# Patient Record
Sex: MALE | Race: WHITE | Employment: STUDENT | URBAN - NONMETROPOLITAN AREA
[De-identification: names, ages, dates, MRNs, and addresses within clinical notes are randomized per-mention and may not be internally consistent; named-entity substitution may affect disease eponyms.]

---

## 2022-01-16 ENCOUNTER — HOSPITAL ENCOUNTER (INPATIENT)
Age: 22
LOS: 3 days | Discharge: HOME | DRG: 854 | End: 2022-01-19
Attending: FAMILY MEDICINE | Admitting: NURSE PRACTITIONER
Payer: COMMERCIAL

## 2022-01-16 ENCOUNTER — APPOINTMENT (OUTPATIENT)
Dept: GENERAL RADIOLOGY | Age: 22
DRG: 854 | End: 2022-01-16
Payer: COMMERCIAL

## 2022-01-16 DIAGNOSIS — G89.18 PAIN FOLLOWING SURGERY OR PROCEDURE: ICD-10-CM

## 2022-01-16 DIAGNOSIS — L03.114 CELLULITIS OF FOREARM, LEFT: Primary | ICD-10-CM

## 2022-01-16 PROBLEM — L03.90 CELLULITIS: Status: ACTIVE | Noted: 2022-01-16

## 2022-01-16 LAB
ALBUMIN SERPL-MCNC: 4 GM/DL (ref 3.4–5)
ALP BLD-CCNC: 68 U/L (ref 46–116)
ALT SERPL-CCNC: 40 U/L (ref 14–63)
ANION GAP: 10 MEQ/L (ref 8–16)
AST SERPL-CCNC: 20 U/L (ref 15–37)
BASOPHILS # BLD: 0.8 % (ref 0–3)
BILIRUB SERPL-MCNC: 1.6 MG/DL (ref 0.2–1)
BUN BLDV-MCNC: 11 MG/DL (ref 7–18)
C-REACTIVE PROTEIN: 17.24 MG/DL (ref 0–1)
CHLORIDE BLD-SCNC: 100 MEQ/L (ref 98–107)
CO2: 27 MEQ/L (ref 21–32)
CREAT SERPL-MCNC: 1.3 MG/DL (ref 0.6–1.3)
EOSINOPHILS RELATIVE PERCENT: 0.9 % (ref 0–4)
GFR, ESTIMATED: 73 ML/MIN/1.73M2
GLUCOSE BLD-MCNC: 104 MG/DL (ref 74–106)
HCT VFR BLD CALC: 46.1 % (ref 42–52)
HEMOGLOBIN: 15 GM/DL (ref 14–18)
LYMPHOCYTES # BLD: 11.5 % (ref 15–47)
MCH RBC QN AUTO: 31.1 PG (ref 27–31)
MCHC RBC AUTO-ENTMCNC: 32.6 GM/DL (ref 33–37)
MCV RBC AUTO: 95.2 FL (ref 80–94)
MONOCYTES: 13.2 % (ref 0–12)
PDW BLD-RTO: 11.8 % (ref 11.5–14.5)
PLATELET # BLD: 200 THOU/MM3 (ref 130–400)
PMV BLD AUTO: 7.8 FL (ref 7.4–10.4)
POC CALCIUM: 9 MG/DL (ref 8.5–10.1)
POTASSIUM SERPL-SCNC: 3.8 MEQ/L (ref 3.5–5.1)
RBC # BLD: 4.84 MILL/MM3 (ref 4.7–6.1)
SEGS: 73.6 % (ref 43–75)
SODIUM BLD-SCNC: 137 MEQ/L (ref 136–145)
TOTAL PROTEIN: 7.9 GM/DL (ref 6.4–8.2)
WBC # BLD: 9.7 THOU/MM3 (ref 4.8–10.8)

## 2022-01-16 PROCEDURE — 85651 RBC SED RATE NONAUTOMATED: CPT

## 2022-01-16 PROCEDURE — 87040 BLOOD CULTURE FOR BACTERIA: CPT

## 2022-01-16 PROCEDURE — 86140 C-REACTIVE PROTEIN: CPT

## 2022-01-16 PROCEDURE — 6360000002 HC RX W HCPCS: Performed by: NURSE PRACTITIONER

## 2022-01-16 PROCEDURE — 73080 X-RAY EXAM OF ELBOW: CPT

## 2022-01-16 PROCEDURE — 6370000000 HC RX 637 (ALT 250 FOR IP): Performed by: NURSE PRACTITIONER

## 2022-01-16 PROCEDURE — 2580000003 HC RX 258: Performed by: NURSE PRACTITIONER

## 2022-01-16 PROCEDURE — 80053 COMPREHEN METABOLIC PANEL: CPT

## 2022-01-16 PROCEDURE — 6360000002 HC RX W HCPCS: Performed by: FAMILY MEDICINE

## 2022-01-16 PROCEDURE — 2580000003 HC RX 258: Performed by: FAMILY MEDICINE

## 2022-01-16 PROCEDURE — 85025 COMPLETE CBC W/AUTO DIFF WBC: CPT

## 2022-01-16 PROCEDURE — 99222 1ST HOSP IP/OBS MODERATE 55: CPT | Performed by: NURSE PRACTITIONER

## 2022-01-16 PROCEDURE — 6370000000 HC RX 637 (ALT 250 FOR IP): Performed by: FAMILY MEDICINE

## 2022-01-16 PROCEDURE — 36415 COLL VENOUS BLD VENIPUNCTURE: CPT

## 2022-01-16 PROCEDURE — 1200000000 HC SEMI PRIVATE

## 2022-01-16 PROCEDURE — 99282 EMERGENCY DEPT VISIT SF MDM: CPT

## 2022-01-16 RX ORDER — ONDANSETRON 4 MG/1
4 TABLET, ORALLY DISINTEGRATING ORAL EVERY 8 HOURS PRN
Status: DISCONTINUED | OUTPATIENT
Start: 2022-01-16 | End: 2022-01-19 | Stop reason: HOSPADM

## 2022-01-16 RX ORDER — MAGNESIUM SULFATE IN WATER 40 MG/ML
2000 INJECTION, SOLUTION INTRAVENOUS PRN
Status: DISCONTINUED | OUTPATIENT
Start: 2022-01-16 | End: 2022-01-19 | Stop reason: HOSPADM

## 2022-01-16 RX ORDER — ACETAMINOPHEN 325 MG/1
650 TABLET ORAL ONCE
Status: COMPLETED | OUTPATIENT
Start: 2022-01-16 | End: 2022-01-16

## 2022-01-16 RX ORDER — HYDROCODONE BITARTRATE AND ACETAMINOPHEN 5; 325 MG/1; MG/1
2 TABLET ORAL EVERY 4 HOURS PRN
Status: DISCONTINUED | OUTPATIENT
Start: 2022-01-16 | End: 2022-01-19 | Stop reason: HOSPADM

## 2022-01-16 RX ORDER — HYDROCODONE BITARTRATE AND ACETAMINOPHEN 5; 325 MG/1; MG/1
1 TABLET ORAL EVERY 4 HOURS PRN
Status: DISCONTINUED | OUTPATIENT
Start: 2022-01-16 | End: 2022-01-19 | Stop reason: HOSPADM

## 2022-01-16 RX ORDER — SODIUM CHLORIDE 0.9 % (FLUSH) 0.9 %
5-40 SYRINGE (ML) INJECTION PRN
Status: DISCONTINUED | OUTPATIENT
Start: 2022-01-16 | End: 2022-01-19 | Stop reason: HOSPADM

## 2022-01-16 RX ORDER — POTASSIUM CHLORIDE 20 MEQ/1
40 TABLET, EXTENDED RELEASE ORAL PRN
Status: DISCONTINUED | OUTPATIENT
Start: 2022-01-16 | End: 2022-01-19 | Stop reason: HOSPADM

## 2022-01-16 RX ORDER — SODIUM CHLORIDE 0.9 % (FLUSH) 0.9 %
5-40 SYRINGE (ML) INJECTION EVERY 12 HOURS SCHEDULED
Status: DISCONTINUED | OUTPATIENT
Start: 2022-01-16 | End: 2022-01-19 | Stop reason: HOSPADM

## 2022-01-16 RX ORDER — POTASSIUM CHLORIDE 7.45 MG/ML
10 INJECTION INTRAVENOUS PRN
Status: DISCONTINUED | OUTPATIENT
Start: 2022-01-16 | End: 2022-01-19 | Stop reason: HOSPADM

## 2022-01-16 RX ORDER — CEFAZOLIN SODIUM 1 G/50ML
1000 INJECTION, SOLUTION INTRAVENOUS EVERY 8 HOURS
Status: DISCONTINUED | OUTPATIENT
Start: 2022-01-16 | End: 2022-01-19 | Stop reason: HOSPADM

## 2022-01-16 RX ORDER — SODIUM CHLORIDE 9 MG/ML
25 INJECTION, SOLUTION INTRAVENOUS PRN
Status: DISCONTINUED | OUTPATIENT
Start: 2022-01-16 | End: 2022-01-19 | Stop reason: HOSPADM

## 2022-01-16 RX ORDER — ONDANSETRON 2 MG/ML
4 INJECTION INTRAMUSCULAR; INTRAVENOUS EVERY 6 HOURS PRN
Status: DISCONTINUED | OUTPATIENT
Start: 2022-01-16 | End: 2022-01-19 | Stop reason: HOSPADM

## 2022-01-16 RX ORDER — ACETAMINOPHEN 650 MG/1
650 SUPPOSITORY RECTAL EVERY 6 HOURS PRN
Status: DISCONTINUED | OUTPATIENT
Start: 2022-01-16 | End: 2022-01-19 | Stop reason: HOSPADM

## 2022-01-16 RX ORDER — IBUPROFEN 800 MG/1
800 TABLET ORAL EVERY 6 HOURS PRN
COMMUNITY

## 2022-01-16 RX ORDER — POLYETHYLENE GLYCOL 3350 17 G/17G
17 POWDER, FOR SOLUTION ORAL DAILY PRN
Status: DISCONTINUED | OUTPATIENT
Start: 2022-01-16 | End: 2022-01-19 | Stop reason: HOSPADM

## 2022-01-16 RX ORDER — ACETAMINOPHEN 325 MG/1
650 TABLET ORAL EVERY 6 HOURS PRN
Status: DISCONTINUED | OUTPATIENT
Start: 2022-01-16 | End: 2022-01-19 | Stop reason: HOSPADM

## 2022-01-16 RX ORDER — SULFAMETHOXAZOLE AND TRIMETHOPRIM 800; 160 MG/1; MG/1
1 TABLET ORAL 2 TIMES DAILY
Status: ON HOLD | COMMUNITY
End: 2022-01-19 | Stop reason: HOSPADM

## 2022-01-16 RX ADMIN — SODIUM CHLORIDE 25 ML: 9 INJECTION, SOLUTION INTRAVENOUS at 23:41

## 2022-01-16 RX ADMIN — CEFAZOLIN SODIUM 1000 MG: 1 INJECTION, SOLUTION INTRAVENOUS at 23:41

## 2022-01-16 RX ADMIN — ACETAMINOPHEN 650 MG: 325 TABLET ORAL at 11:35

## 2022-01-16 RX ADMIN — VANCOMYCIN HYDROCHLORIDE 1000 MG: 1 INJECTION, POWDER, LYOPHILIZED, FOR SOLUTION INTRAVENOUS at 11:19

## 2022-01-16 RX ADMIN — CEFAZOLIN SODIUM 1000 MG: 1 INJECTION, SOLUTION INTRAVENOUS at 16:05

## 2022-01-16 RX ADMIN — ACETAMINOPHEN 650 MG: 325 TABLET ORAL at 19:42

## 2022-01-16 RX ADMIN — SODIUM CHLORIDE, PRESERVATIVE FREE 10 ML: 5 INJECTION INTRAVENOUS at 23:23

## 2022-01-16 ASSESSMENT — PAIN DESCRIPTION - ORIENTATION
ORIENTATION: LEFT

## 2022-01-16 ASSESSMENT — ENCOUNTER SYMPTOMS
COUGH: 0
VOMITING: 0
SHORTNESS OF BREATH: 0
SORE THROAT: 0
NAUSEA: 0

## 2022-01-16 ASSESSMENT — PAIN SCALES - GENERAL
PAINLEVEL_OUTOF10: 5
PAINLEVEL_OUTOF10: 4
PAINLEVEL_OUTOF10: 5
PAINLEVEL_OUTOF10: 0
PAINLEVEL_OUTOF10: 7

## 2022-01-16 ASSESSMENT — PAIN DESCRIPTION - PAIN TYPE
TYPE: ACUTE PAIN
TYPE: ACUTE PAIN

## 2022-01-16 ASSESSMENT — PAIN DESCRIPTION - PROGRESSION: CLINICAL_PROGRESSION: NOT CHANGED

## 2022-01-16 ASSESSMENT — PAIN DESCRIPTION - LOCATION
LOCATION: ELBOW

## 2022-01-16 ASSESSMENT — PAIN DESCRIPTION - FREQUENCY: FREQUENCY: CONTINUOUS

## 2022-01-16 ASSESSMENT — PAIN DESCRIPTION - DESCRIPTORS: DESCRIPTORS: THROBBING

## 2022-01-16 NOTE — PROGRESS NOTES
Patient admitted to [facility]. Mother and girlfriend in room. Admission completed. Orientation to room completed.

## 2022-01-16 NOTE — ED PROVIDER NOTES
[facility]  eMERGENCY dEPARTMENT eNCOUnter          CHIEF COMPLAINT       Chief Complaint   Patient presents with    Cellulitis     left elbow redness, has had 2 doses of bactrim and redness is worse. Nurses Notes reviewed and I agree except as noted in the HPI. HISTORY OF PRESENT ILLNESS    Mary Kamara is a 24 y.o. male who presents with prominent left elbow redness, and warmth to the left upper lower arm and left elbow area. Symptoms started a few days ago according to the patient. He was placed on Bactrim as an outpatient but the redness has become more profuse and he is running a temperature. He denies any known trauma to the left elbow. REVIEW OF SYSTEMS     Review of Systems   Constitutional: Positive for activity change and fever. HENT: Negative for congestion and sore throat. Respiratory: Negative for cough and shortness of breath. Gastrointestinal: Negative for nausea and vomiting. Musculoskeletal: Positive for arthralgias (left elbow ) and joint swelling. Skin: Positive for rash (left elbow). Psychiatric/Behavioral: Negative for agitation and behavioral problems. All other systems reviewed and are negative. PAST MEDICAL HISTORY    has no past medical history on file. SURGICAL HISTORY      has a past surgical history that includes Knee arthroscopy (Right, 04/2021). CURRENT MEDICATIONS       Previous Medications    IBUPROFEN (ADVIL;MOTRIN) 800 MG TABLET    Take 800 mg by mouth every 6 hours as needed for Pain    SULFAMETHOXAZOLE-TRIMETHOPRIM (BACTRIM DS;SEPTRA DS) 800-160 MG PER TABLET    Take 1 tablet by mouth 2 times daily       ALLERGIES     has No Known Allergies. FAMILY HISTORY     He indicated that the status of his maternal grandmother is unknown. He indicated that the status of his paternal grandmother is unknown.  He indicated that the status of his paternal grandfather is unknown.   family history includes Cancer in his paternal grandfather and paternal grandmother; High Blood Pressure in his maternal grandmother. SOCIAL HISTORY      reports that he has never smoked. He has never used smokeless tobacco. He reports current alcohol use. PHYSICAL EXAM     INITIAL VITALS:  height is 6' (1.829 m) and weight is 240 lb (108.9 kg). His temporal temperature is 100.6 °F (38.1 °C). His blood pressure is 118/65 and his pulse is 105. His respiration is 16 and oxygen saturation is 97%. Physical Exam  Vitals and nursing note reviewed. Constitutional:       General: He is in acute distress. Musculoskeletal:         General: Swelling and tenderness (left proximal lower arm and left elbow area) present. No deformity or signs of injury. Skin:     General: Skin is dry. Capillary Refill: Capillary refill takes less than 2 seconds. Findings: Erythema present. No bruising. Neurological:      Mental Status: He is alert. Sensory: No sensory deficit. DIFFERENTIAL DIAGNOSIS:   Cellulitis,olecranon bursitis,septic joint nos    DIAGNOSTIC RESULTS         RADIOLOGY: non-plain film images(s) such as CT, Ultrasound and MRI are read by the radiologist.        XR ELBOW LEFT (MIN 3 VIEWS) (Final result)  Result time 01/16/22 11:10:12  Final result by Erna Batres MD (01/16/22 11:10:12)                Impression:    1. There is soft tissue swelling along the dorsal aspect of the mid and proximal left humerus on the lateral view as well as overlying the olecranon which may represent cellulitis. Cannot exclude underlying olecranon bursitis. 2. No acute fractures seen. No periosteal reaction or osseous erosions are identified. **This report has been created using voice recognition software.  It may contain minor errors which are inherent in voice recognition technology. **     Final report electronically signed by Dr. Jasper Hare on 1/16/2022 11:10 AM            Narrative:    PROCEDURE: XR ELBOW LEFT (MIN 3 VIEWS) CLINICAL INFORMATION: left elbow cellulitis,fever     COMPARISON: No prior study. TECHNIQUE:  Left elbow 4 views       FINDINGS:     There is soft tissue swelling along the dorsal aspect of the mid and proximal left humerus on the lateral view as well as overlying the olecranon which may represent cellulitis. Cannot exclude underlying olecranon bursitis. No acute fractures seen. No periosteal reaction or osseous erosions are identified. LABS:   Labs Reviewed   CBC WITH AUTO DIFFERENTIAL - Abnormal; Notable for the following components:       Result Value    MCV 95.2 (*)     MCH 31.1 (*)     MCHC 32.6 (*)     Lymphocytes 11.5 (*)     Monocytes 13.2 (*)     All other components within normal limits   COMPREHENSIVE METABOLIC PANEL - Abnormal; Notable for the following components: Total Bilirubin 1.6 (*)     All other components within normal limits   GLOMERULAR FILTRATION RATE, ESTIMATED - Abnormal; Notable for the following components:    GFR, Estimated 73 (*)     All other components within normal limits   CULTURE, BLOOD 1   CULTURE, BLOOD 2   ANION GAP       EMERGENCY DEPARTMENT COURSE:   Vitals:    Vitals:    01/16/22 1029   BP: 118/65   Pulse: 105   Resp: 16   Temp: 100.6 °F (38.1 °C)   TempSrc: Temporal   SpO2: 97%   Weight: 240 lb (108.9 kg)   Height: 6' (1.829 m)   On exam there is prominent erythema with well demarcated area along the left lower upper arm and left elbow area no evidence at this point that would suggest olecranon bursitis however cellulitis would be high on the differential.  He was started on Bactrim yesterday and did does admit to taking a full days worth however the redness has become much more prominent. He started running a fever today. Labs were reassuring white count was 9.7.   X-ray did show some soft tissue swelling along the dorsal aspect of the mid and proximal left humerus on the lateral view as well as overlying of the olecranon which may represent cellulitis. Blood cultures x2 were obtained vancomycin 1 g was given IV. Based on his outpatient failure I did request bed for further evaluation in the inpatient setting. PROCEDURES:  None    FINAL IMPRESSION      1. Cellulitis of forearm, left          DISPOSITION/PLAN   Admit    PATIENT REFERRED TO:  No follow-up provider specified.     DISCHARGE MEDICATIONS:  New Prescriptions    No medications on file       (Please note that portions of this note were completed with a voice recognition program.  Efforts were made to edit the dictations but occasionally words are mis-transcribed.)    MD Bard Matt Avila MD  01/16/22 1160

## 2022-01-16 NOTE — PROGRESS NOTES
Assessment complete. Patient left elbow swollen, red, and warm to the touch. His mother had drawn around border last evening. Redness has moved outside of the border. New border drawn. Will continue to monitor.

## 2022-01-16 NOTE — ED NOTES
RAC INT flushed with saline, swab cap applied and INT wrapped with coban.       Adria Snyder, RN  01/16/22 1613 [facility], RN  01/16/22 9339

## 2022-01-16 NOTE — ED NOTES
Report to [facility].       Naomi Maldonado RN  01/16/22 6370

## 2022-01-16 NOTE — H&P
History & Physical        Patient:  Zeferino Lanier  YOB: 2000    MRN: [id number]     Acct: [de-identified]    PCP: Lala Fabry , APRN - CNP    Date of Admission: 1/16/2022    Date of Service: Pt seen/examined on 01/16/22  and Admitted to Inpatient with expected LOS greater than two midnights due to medical therapy. ASSESSMENT/PLAN:    1. Cellulitis left elbow (POA), failed outpatient treatment with Bactrim--2 blood cultures have been obtained; x-ray left elbow reveals soft tissue swelling along the dorsal aspect of the mid and proximal left humerus on the lateral view as well possible olecranial bursitis; had vancomycin x1 dose on 1/16; add Ancef 1/16        Chief Complaint: Left elbow redness and swelling      History Of Present Illness:    24 y.o. male who presented to [facility] with left elbow redness and swelling; a few days ago patient noticed that his left elbow had redness and warmth along with swelling; he was seen at an urgent care center yesterday and was started on Bactrim and had 2 doses; mother marked the area and today the redness increased so patient was taken to urgent care, patient was given vancomycin x1 dose and was sent here for further evaluation; patient is currently sitting up in the bed, complaining of left elbow pain rates 5 out of 10, area is swollen, reddened and warm to the touch, he does have full range of motion noted; he denies any medical problems, denies any injury, denies any known insect bite; he is being admitted to hospital service for further care and evaluation. Past Medical History:      History reviewed. No pertinent past medical history. Past Surgical History:          Procedure Laterality Date    KNEE ARTHROSCOPY Right 04/2021       Medications Prior to Admission:      Prior to Admission medications    Medication Sig Start Date End Date Taking?  Authorizing Provider   ibuprofen (ADVIL;MOTRIN) 800 MG tablet Take 800 mg by mouth every 6 hours as needed for Pain   Yes Historical Provider, MD   sulfamethoxazole-trimethoprim (BACTRIM DS;SEPTRA DS) 800-160 MG per tablet Take 1 tablet by mouth 2 times daily   Yes Historical Provider, MD       Allergies:  Patient has no known allergies. Social History:   reports that he has never smoked. He has never used smokeless tobacco. He reports current alcohol use.     Family History:      Positive as follows:        Problem Relation Age of Onset    High Blood Pressure Maternal Grandmother     Cancer Paternal Grandmother     Cancer Paternal Grandfather        REVIEW OF SYSTEMS:     Constitutional: ROS: positive for - chills or fever  Head: no headache, no head injury, no migraine   Eyes ROS: denies blurred/double vision  Ears ROS: no hearing difficulty, no tinnitus  Mouth and Throat ROS: no ulceration, dysphagia, dental caries  Psychological ROS: no depression, no anxiety, no panic attacks, denies suicide/homicide ideation  Endocrine ROS: denies polyuria, polydypsia, no heat or cold intolerance  Respiratory ROS: no cough, shortness of breath, or wheezing  Cardiovascular ROS: no chest pain or dyspnea on exertion  Gastrointestinal ROS: no abdominal pain, change in bowel habits, or black or bloody stools  Genito-Urinary ROS: denies dysuria, frequency, urgency; denies hematuria  Musculoskeletal ROS: positive for -pain, or redness, swelling to left elbow  Neurological ROS: no syncope, no seizures, no numbness or tingling of hands, no numbness or tingling of feet, no paresis  Dermatology: no skin rash, no eczema  Endocrine: no polyuria, polydypsia, no heat/cold intolerance  Hematology: denies bruising easily, denies bleeding problems, denies clotting disorders    PHYSICAL EXAM:    /75   Pulse 91   Temp 100.6 °F (38.1 °C) (Temporal)   Resp 16   Ht 6' (1.829 m)   Wt 240 lb (108.9 kg)   SpO2 97%   BMI 32.55 kg/m²     General appearance:  No apparent distress, appears stated age and cooperative. HEENT:  Normal cephalic, atraumatic without obvious deformity. Pupils equal, round, and reactive to light. Conjunctivae/corneas clear. Neck: Supple, with full range of motion. No jugular venous distention. Trachea midline. Respiratory:  Normal respiratory effort. Clear to auscultation, bilaterally without Rales/Wheezes/Rhonchi. Cardiovascular:  Regular rate and rhythm with normal S1/S2 without murmurs, rubs or gallops. Abdomen: Soft, non-tender, non-distended with normal bowel sounds. Musculoskeletal: Left elbow area with redness, swelling and warmth, patient is able to flex and extend his arm  Skin: Skin color, texture, turgor normal.    Neurologic:  Neurovascularly intact without any focal sensory/motor deficits. Cranial nerves: II-XII intact, grossly non-focal.  Psychiatric:  Alert and oriented, thought content appropriate  Capillary Refill: Brisk,< 3 seconds   Peripheral Pulses: +2 palpable, equal bilaterally       Labs:     Recent Labs     01/16/22  1050   WBC 9.7   HGB 15.0   HCT 46.1        Recent Labs     01/16/22  1050      K 3.8      CO2 27   BUN 11   CREATININE 1.3     Recent Labs     01/16/22  1050   AST 20   ALT 40   BILITOT 1.6*   ALKPHOS 68     Radiology:     XR ELBOW LEFT (MIN 3 VIEWS)    Result Date: 1/16/2022  PROCEDURE: XR ELBOW LEFT (MIN 3 VIEWS) CLINICAL INFORMATION: left elbow cellulitis,fever COMPARISON: No prior study. TECHNIQUE:  Left elbow 4 views  FINDINGS: There is soft tissue swelling along the dorsal aspect of the mid and proximal left humerus on the lateral view as well as overlying the olecranon which may represent cellulitis. Cannot exclude underlying olecranon bursitis. No acute fractures seen. No periosteal reaction or osseous erosions are identified. 1. There is soft tissue swelling along the dorsal aspect of the mid and proximal left humerus on the lateral view as well as overlying the olecranon which may represent cellulitis.  Cannot exclude underlying olecranon bursitis. 2. No acute fractures seen. No periosteal reaction or osseous erosions are identified. **This report has been created using voice recognition software. It may contain minor errors which are inherent in voice recognition technology. ** Final report electronically signed by Dr. Ferny Johnson on 1/16/2022 11:10 AM    Thank you BETHEL Downey CNP for the opportunity to be involved in this patient's care.     Electronically signed by BETHEL Perdomo CNP on 1/16/2022 at 1:35 PM

## 2022-01-16 NOTE — ED TRIAGE NOTES
Left elbow redness and edema since Friday. Was at the urgent care yesterday and started bactrim, had 2 doses yesterday. Area was marked yesterday and today redness has significantly increased.

## 2022-01-17 ENCOUNTER — ANESTHESIA (OUTPATIENT)
Dept: OPERATING ROOM | Age: 22
DRG: 854 | End: 2022-01-17
Payer: COMMERCIAL

## 2022-01-17 ENCOUNTER — ANESTHESIA EVENT (OUTPATIENT)
Dept: OPERATING ROOM | Age: 22
DRG: 854 | End: 2022-01-17
Payer: COMMERCIAL

## 2022-01-17 VITALS — OXYGEN SATURATION: 98 % | TEMPERATURE: 99.9 F | DIASTOLIC BLOOD PRESSURE: 57 MMHG | SYSTOLIC BLOOD PRESSURE: 105 MMHG

## 2022-01-17 LAB
ANION GAP SERPL CALCULATED.3IONS-SCNC: 10 MEQ/L (ref 8–16)
BUN BLDV-MCNC: 10 MG/DL (ref 7–22)
CALCIUM SERPL-MCNC: 9 MG/DL (ref 8.5–10.5)
CHLORIDE BLD-SCNC: 102 MEQ/L (ref 98–111)
CO2: 24 MEQ/L (ref 23–33)
CREAT SERPL-MCNC: 1 MG/DL (ref 0.4–1.2)
ERYTHROCYTE [DISTWIDTH] IN BLOOD BY AUTOMATED COUNT: 12 % (ref 11.5–14.5)
ERYTHROCYTE [DISTWIDTH] IN BLOOD BY AUTOMATED COUNT: 41.5 FL (ref 35–45)
GFR SERPL CREATININE-BSD FRML MDRD: > 90 ML/MIN/1.73M2
GLUCOSE BLD-MCNC: 116 MG/DL (ref 70–108)
HCT VFR BLD CALC: 42.5 % (ref 42–52)
HEMOGLOBIN: 14 GM/DL (ref 14–18)
MCH RBC QN AUTO: 31.1 PG (ref 26–33)
MCHC RBC AUTO-ENTMCNC: 32.9 GM/DL (ref 32.2–35.5)
MCV RBC AUTO: 94.4 FL (ref 80–94)
PLATELET # BLD: 186 THOU/MM3 (ref 130–400)
PMV BLD AUTO: 10.5 FL (ref 9.4–12.4)
POTASSIUM REFLEX MAGNESIUM: 3.7 MEQ/L (ref 3.5–5.2)
RBC # BLD: 4.5 MILL/MM3 (ref 4.7–6.1)
SEDIMENTATION RATE, ERYTHROCYTE: 30 MM/HR (ref 0–10)
SODIUM BLD-SCNC: 136 MEQ/L (ref 135–145)
WBC # BLD: 8.2 THOU/MM3 (ref 4.8–10.8)

## 2022-01-17 PROCEDURE — 0MB44ZZ EXCISION OF LEFT ELBOW BURSA AND LIGAMENT, PERCUTANEOUS ENDOSCOPIC APPROACH: ICD-10-PCS | Performed by: ORTHOPAEDIC SURGERY

## 2022-01-17 PROCEDURE — 1200000000 HC SEMI PRIVATE

## 2022-01-17 PROCEDURE — 3700000000 HC ANESTHESIA ATTENDED CARE: Performed by: ORTHOPAEDIC SURGERY

## 2022-01-17 PROCEDURE — 6370000000 HC RX 637 (ALT 250 FOR IP): Performed by: NURSE PRACTITIONER

## 2022-01-17 PROCEDURE — 3600000004 HC SURGERY LEVEL 4 BASE: Performed by: ORTHOPAEDIC SURGERY

## 2022-01-17 PROCEDURE — 7100000000 HC PACU RECOVERY - FIRST 15 MIN: Performed by: ORTHOPAEDIC SURGERY

## 2022-01-17 PROCEDURE — 2580000003 HC RX 258: Performed by: NURSE ANESTHETIST, CERTIFIED REGISTERED

## 2022-01-17 PROCEDURE — 99232 SBSQ HOSP IP/OBS MODERATE 35: CPT | Performed by: NURSE PRACTITIONER

## 2022-01-17 PROCEDURE — 6360000002 HC RX W HCPCS: Performed by: NURSE PRACTITIONER

## 2022-01-17 PROCEDURE — 6360000002 HC RX W HCPCS: Performed by: NURSE ANESTHETIST, CERTIFIED REGISTERED

## 2022-01-17 PROCEDURE — 80048 BASIC METABOLIC PNL TOTAL CA: CPT

## 2022-01-17 PROCEDURE — 3700000001 HC ADD 15 MINUTES (ANESTHESIA): Performed by: ORTHOPAEDIC SURGERY

## 2022-01-17 PROCEDURE — 87070 CULTURE OTHR SPECIMN AEROBIC: CPT

## 2022-01-17 PROCEDURE — 87075 CULTR BACTERIA EXCEPT BLOOD: CPT

## 2022-01-17 PROCEDURE — 2709999900 HC NON-CHARGEABLE SUPPLY: Performed by: ORTHOPAEDIC SURGERY

## 2022-01-17 PROCEDURE — 3600000014 HC SURGERY LEVEL 4 ADDTL 15MIN: Performed by: ORTHOPAEDIC SURGERY

## 2022-01-17 PROCEDURE — 87205 SMEAR GRAM STAIN: CPT

## 2022-01-17 PROCEDURE — 2580000003 HC RX 258: Performed by: NURSE PRACTITIONER

## 2022-01-17 PROCEDURE — 7100000001 HC PACU RECOVERY - ADDTL 15 MIN: Performed by: ORTHOPAEDIC SURGERY

## 2022-01-17 PROCEDURE — 85027 COMPLETE CBC AUTOMATED: CPT

## 2022-01-17 PROCEDURE — 36415 COLL VENOUS BLD VENIPUNCTURE: CPT

## 2022-01-17 RX ORDER — MEPERIDINE HYDROCHLORIDE 25 MG/ML
12.5 INJECTION INTRAMUSCULAR; INTRAVENOUS; SUBCUTANEOUS EVERY 5 MIN PRN
Status: DISCONTINUED | OUTPATIENT
Start: 2022-01-17 | End: 2022-01-17

## 2022-01-17 RX ORDER — MIDAZOLAM HYDROCHLORIDE 1 MG/ML
INJECTION INTRAMUSCULAR; INTRAVENOUS PRN
Status: DISCONTINUED | OUTPATIENT
Start: 2022-01-17 | End: 2022-01-17 | Stop reason: SDUPTHER

## 2022-01-17 RX ORDER — ONDANSETRON 2 MG/ML
INJECTION INTRAMUSCULAR; INTRAVENOUS PRN
Status: DISCONTINUED | OUTPATIENT
Start: 2022-01-17 | End: 2022-01-17 | Stop reason: SDUPTHER

## 2022-01-17 RX ORDER — FENTANYL CITRATE 50 UG/ML
50 INJECTION, SOLUTION INTRAMUSCULAR; INTRAVENOUS EVERY 5 MIN PRN
Status: DISCONTINUED | OUTPATIENT
Start: 2022-01-17 | End: 2022-01-17

## 2022-01-17 RX ORDER — FENTANYL CITRATE 50 UG/ML
25 INJECTION, SOLUTION INTRAMUSCULAR; INTRAVENOUS EVERY 5 MIN PRN
Status: DISCONTINUED | OUTPATIENT
Start: 2022-01-17 | End: 2022-01-17

## 2022-01-17 RX ORDER — HYDROCODONE BITARTRATE AND ACETAMINOPHEN 5; 325 MG/1; MG/1
1-2 TABLET ORAL EVERY 6 HOURS PRN
Qty: 30 TABLET | Refills: 0 | Status: SHIPPED | OUTPATIENT
Start: 2022-01-17 | End: 2022-01-24

## 2022-01-17 RX ORDER — FENTANYL CITRATE 50 UG/ML
INJECTION, SOLUTION INTRAMUSCULAR; INTRAVENOUS PRN
Status: DISCONTINUED | OUTPATIENT
Start: 2022-01-17 | End: 2022-01-17 | Stop reason: SDUPTHER

## 2022-01-17 RX ORDER — MORPHINE SULFATE 2 MG/ML
4 INJECTION, SOLUTION INTRAMUSCULAR; INTRAVENOUS
Status: ACTIVE | OUTPATIENT
Start: 2022-01-17 | End: 2022-01-19

## 2022-01-17 RX ORDER — PROMETHAZINE HYDROCHLORIDE 25 MG/ML
12.5 INJECTION, SOLUTION INTRAMUSCULAR; INTRAVENOUS
Status: DISCONTINUED | OUTPATIENT
Start: 2022-01-17 | End: 2022-01-17

## 2022-01-17 RX ORDER — SODIUM CHLORIDE 9 MG/ML
INJECTION, SOLUTION INTRAVENOUS CONTINUOUS PRN
Status: DISCONTINUED | OUTPATIENT
Start: 2022-01-17 | End: 2022-01-17 | Stop reason: SDUPTHER

## 2022-01-17 RX ORDER — PROPOFOL 10 MG/ML
INJECTION, EMULSION INTRAVENOUS PRN
Status: DISCONTINUED | OUTPATIENT
Start: 2022-01-17 | End: 2022-01-17 | Stop reason: SDUPTHER

## 2022-01-17 RX ORDER — MORPHINE SULFATE 2 MG/ML
2 INJECTION, SOLUTION INTRAMUSCULAR; INTRAVENOUS
Status: ACTIVE | OUTPATIENT
Start: 2022-01-17 | End: 2022-01-19

## 2022-01-17 RX ORDER — LABETALOL 20 MG/4 ML (5 MG/ML) INTRAVENOUS SYRINGE
5 EVERY 10 MIN PRN
Status: DISCONTINUED | OUTPATIENT
Start: 2022-01-17 | End: 2022-01-17

## 2022-01-17 RX ADMIN — CEFAZOLIN SODIUM 1000 MG: 1 INJECTION, SOLUTION INTRAVENOUS at 06:25

## 2022-01-17 RX ADMIN — SODIUM CHLORIDE: 9 INJECTION, SOLUTION INTRAVENOUS at 12:54

## 2022-01-17 RX ADMIN — Medication 100 MG: at 13:00

## 2022-01-17 RX ADMIN — FENTANYL CITRATE 100 MCG: 50 INJECTION, SOLUTION INTRAMUSCULAR; INTRAVENOUS at 12:54

## 2022-01-17 RX ADMIN — CEFAZOLIN SODIUM 1000 MG: 1 INJECTION, SOLUTION INTRAVENOUS at 23:05

## 2022-01-17 RX ADMIN — SODIUM CHLORIDE, PRESERVATIVE FREE 10 ML: 5 INJECTION INTRAVENOUS at 23:02

## 2022-01-17 RX ADMIN — MIDAZOLAM 5 MG: 1 INJECTION INTRAMUSCULAR; INTRAVENOUS at 12:54

## 2022-01-17 RX ADMIN — PROPOFOL 200 MG: 10 INJECTION, EMULSION INTRAVENOUS at 13:00

## 2022-01-17 RX ADMIN — CEFAZOLIN SODIUM 1000 MG: 1 INJECTION, SOLUTION INTRAVENOUS at 16:19

## 2022-01-17 RX ADMIN — FENTANYL CITRATE 50 MCG: 50 INJECTION, SOLUTION INTRAMUSCULAR; INTRAVENOUS at 13:29

## 2022-01-17 RX ADMIN — Medication 1250 MG: at 14:38

## 2022-01-17 RX ADMIN — ONDANSETRON 4 MG: 2 INJECTION INTRAMUSCULAR; INTRAVENOUS at 13:08

## 2022-01-17 RX ADMIN — ACETAMINOPHEN 650 MG: 325 TABLET ORAL at 02:58

## 2022-01-17 RX ADMIN — ACETAMINOPHEN 650 MG: 325 TABLET ORAL at 20:45

## 2022-01-17 RX ADMIN — FENTANYL CITRATE 50 MCG: 50 INJECTION, SOLUTION INTRAMUSCULAR; INTRAVENOUS at 13:08

## 2022-01-17 ASSESSMENT — PULMONARY FUNCTION TESTS
PIF_VALUE: 5
PIF_VALUE: 8
PIF_VALUE: 9
PIF_VALUE: 8
PIF_VALUE: 5
PIF_VALUE: 4
PIF_VALUE: 5
PIF_VALUE: 3
PIF_VALUE: 5
PIF_VALUE: 28
PIF_VALUE: 6
PIF_VALUE: 12
PIF_VALUE: 3
PIF_VALUE: 11
PIF_VALUE: 11
PIF_VALUE: 3
PIF_VALUE: 2
PIF_VALUE: 11
PIF_VALUE: 0
PIF_VALUE: 11
PIF_VALUE: 4
PIF_VALUE: 11
PIF_VALUE: 10
PIF_VALUE: 17
PIF_VALUE: 4
PIF_VALUE: 3
PIF_VALUE: 0
PIF_VALUE: 3
PIF_VALUE: 1
PIF_VALUE: 3
PIF_VALUE: 5
PIF_VALUE: 2

## 2022-01-17 ASSESSMENT — PAIN DESCRIPTION - PAIN TYPE: TYPE: SURGICAL PAIN

## 2022-01-17 ASSESSMENT — PAIN SCALES - GENERAL
PAINLEVEL_OUTOF10: 5
PAINLEVEL_OUTOF10: 0

## 2022-01-17 ASSESSMENT — PAIN DESCRIPTION - ORIENTATION: ORIENTATION: LEFT

## 2022-01-17 ASSESSMENT — PAIN DESCRIPTION - LOCATION: LOCATION: ELBOW

## 2022-01-17 NOTE — PROGRESS NOTES
[address] [facility] Pharmacokinetic Monitoring Service - Vancomycin     Brian Jamison is a 24 y.o. male starting on vancomycin therapy for sepsis secondary to cellulitis left elbow. Pharmacy consulted by Catana Fleischer, CNP for monitoring and adjustment. Target Concentration: Goal AUC/LISA 400-600 mg*hr/L    Additional Antimicrobials: Cefazolin    Pertinent Laboratory Values: Wt Readings from Last 1 Encounters:   01/16/22 240 lb (108.9 kg)     Temp Readings from Last 1 Encounters:   01/17/22 98.7 °F (37.1 °C) (Oral)     Estimated Creatinine Clearance: 148 mL/min (based on SCr of 1 mg/dL). Recent Labs     01/16/22  1050 01/17/22  0658   CREATININE 1.3 1.0   WBC 9.7 8.2     Procalcitonin: Not checked    Pertinent Cultures:  Culture Date Source Results   1/16/22 Blood  NGTD   MRSA Nasal Swab: N/A. Non-respiratory infection. Plan:  Dosing recommendations based on Bayesian software  Start vancomycin 1250 mg Q12H (Note: Patient received vancomycin 1000 mg x 1 dose on 1/16/22 at [id number]).   Anticipated AUC of 456 and trough concentration of 14.3 at steady state  Renal labs as indicated   Vancomycin concentration ordered for 1/18/22 @ 0700 with AM labs   Pharmacy will continue to monitor patient and adjust therapy as indicated    Thank you for the consult,  Rudolph Barreto [facility]  1/17/2022 12:31 PM

## 2022-01-17 NOTE — ANESTHESIA POSTPROCEDURE EVALUATION
Department of Anesthesiology  Postprocedure Note    Patient: Aj Basurto  MRN: [id number]  YOB: 2000  Date of evaluation: 1/17/2022  Time:  3:07 PM     Procedure Summary     Date: 01/17/22 Room / Location: [address] [place]    Anesthesia Start: 9807 Anesthesia Stop: 0755    Procedure: I & D LEFT ELBOW (Left ) Diagnosis: (CELLULITIS, SEPTIC ELBOW)    Surgeons: Abdiaziz Rowley MD Responsible Provider: Nabila Kendrick DO    Anesthesia Type: general ASA Status: 1 - Emergent          Anesthesia Type: general    Pauline Phase I: Pauline Score: 9    Pauline Phase II:      Last vitals: Reviewed and per EMR flowsheets.        Anesthesia Post Evaluation    Patient location during evaluation: PACU  Patient participation: complete - patient participated  Level of consciousness: awake  Airway patency: patent  Nausea & Vomiting: no nausea  Complications: no  Cardiovascular status: hemodynamically stable  Respiratory status: acceptable  Hydration status: stable

## 2022-01-17 NOTE — H&P
History and Physical Update    Pt Name: Marisol Castaneda  MRN: [id number]  YOB: 2000  Date of evaluation: 1/17/2022    [x] I have examined the patient and reviewed the H&P/Consult and there are no changes to the patient or plans.     [] I have examined the patient and reviewed the H&P/Consult and have noted the following changes:        Adan Matute PA-C   Electronically signed 1/17/2022 at 11:44 AM

## 2022-01-17 NOTE — PROGRESS NOTES
1331-pt received to pacu, resp easy, unlabored. Vss. Pt reports no pain, falls back to sleep quickly    1340-pt snoring, vss.     1350-pt remains asleep, vss, snoring. Pt appears in no acute distress. 1356-report called to Phylicia Carey RN on 6E     1401- pt meets criteria for discharge from pacu, awaiting transportation.

## 2022-01-17 NOTE — CONSULTS
Orthopedic Consult    Requesting Physician: Jeimy Ward CNP    CHIEF COMPLAINT:  Left elbow redness and swelling    HISTORY OF PRESENT ILLNESS:      The patient is a 24 y.o. male  who presents with a 2-3 day history of increasing left elbow pain with associated redness and swelling. Patient states that it initially started off rather mild however he started to have increasing pain, redness, and swelling around the elbow. He presented to an urgent care at [facility] and was give PO Bactrim DS however the elbow was not improving. He presented to [facility] ED on 1/16/2022 and was evaluated with lab workup and xray imaging. His WBC was not elevated at that time and xrays demonstrated soft tissue swelling with no fractures. He was admitted by medicine and started on IV antibiotics with ancef and vancomycin. We were asked to evaluate secondary to this elbow pain, redness, and swelling to determine if surgical intervention is warranted. Past Medical History:    History reviewed. No pertinent past medical history.     Past Surgical History:    Past Surgical History:   Procedure Laterality Date    KNEE ARTHROSCOPY Right 04/2021       Medications Prior to Admission:   Current Facility-Administered Medications   Medication Dose Route Frequency Provider Last Rate Last Admin    sodium chloride flush 0.9 % injection 5-40 mL  5-40 mL IntraVENous 2 times per day Ni HELIO Bey APRN - CNP        sodium chloride flush 0.9 % injection 5-40 mL  5-40 mL IntraVENous PRN Ni Bey APRN - CNP        0.9 % sodium chloride infusion  25 mL IntraVENous PRN Ni Bey APRN - CNP        ondansetron (ZOFRAN-ODT) disintegrating tablet 4 mg  4 mg Oral Q8H PRN Ni A BETHEL Bey - CNP        Or    ondansetron (ZOFRAN) injection 4 mg  4 mg IntraVENous Q6H PRN Ni A LANDON BeyN - CNP        polyethylene glycol (GLYCOLAX) packet 17 g  17 g Oral Daily PRN BETHEL Gant CNP        acetaminophen (TYLENOL) tablet 650 mg  650 mg Oral Q6H PRN Brenita Buys, APRN - CNP   650 mg at 01/16/22 1942    Or    acetaminophen (TYLENOL) suppository 650 mg  650 mg Rectal Q6H PRN Brenita Buys, APRN - CNP        potassium chloride (KLOR-CON M) extended release tablet 40 mEq  40 mEq Oral PRN Ni A Rethman, APRN - CNP        Or    potassium bicarb-citric acid (EFFER-K) effervescent tablet 40 mEq  40 mEq Oral PRN Ni A Rethman, APRN - CNP        Or    potassium chloride 10 mEq/100 mL IVPB (Peripheral Line)  10 mEq IntraVENous PRN Brenita Buys, APRN - CNP        magnesium sulfate 2000 mg in 50 mL IVPB premix  2,000 mg IntraVENous PRN Ni A Rethman, APRN - CNP        HYDROcodone-acetaminophen (NORCO) 5-325 MG per tablet 1 tablet  1 tablet Oral Q4H PRN Ni A Rethman, APRN - CNP        Or    HYDROcodone-acetaminophen (NORCO) 5-325 MG per tablet 2 tablet  2 tablet Oral Q4H PRN Ni A Rethman, APRN - CNP        ceFAZolin (ANCEF) 1000 mg in dextrose 5 % 50 mL IVPB (premix)  1,000 mg IntraVENous Q8H Ni A Rethman, APRN - CNP   Paused at 01/16/22 1630         Allergies:  Patient has no known allergies.     Social History:   Social History     Tobacco Use   Smoking Status Never Smoker   Smokeless Tobacco Never Used     Social History     Substance and Sexual Activity   Alcohol Use Yes    Comment: socially     Social History     Substance and Sexual Activity   Drug Use Not on file       Family History:  Family History   Problem Relation Age of Onset    High Blood Pressure Maternal Grandmother     Cancer Paternal Grandmother     Cancer Paternal Grandfather          REVIEW OF SYSTEMS:  Gen: Negative for nausea, vomiting, diarrhea, fever, chills, night sweats  Heart: Negative for HTN, palpitations, chest pain  Lungs: Negative for wheezes, asthma or SOB  GI: Negative for nausea, vomiting  Endo: Negative for diabetes  Heme: Negative for DVT       PHYSICAL EXAM:  Patient Vitals for the past 24 hrs:   BP Temp Temp src Pulse Resp SpO2 Height Weight   01/16/22 1942 113/61 103.1 °F (39.5 °C) Oral 99 18 98 % -- --   01/16/22 1556 129/71 100.9 °F (38.3 °C) Oral 88 18 99 % -- --   01/16/22 1334 -- 99.3 °F (37.4 °C) Oral 82 18 -- 6' (1.829 m) 240 lb (108.9 kg)   01/16/22 1240 126/75 -- -- 91 16 97 % -- --   01/16/22 1029 118/65 100.6 °F (38.1 °C) Temporal 105 16 97 % 6' (1.829 m) 240 lb (108.9 kg)     Gen: alert and oriented x3  Head: normorcephalic, atraumatic  LUE:  Skin in good repair with no obvious deformity. Swelling noted across posterior aspect of elbow and extends distally with erythema/warmth extending volarly and dorsally with previous outline noted with the erythema extending slightly past those borders. Compartments in forearm soft and compressible. No significant tenderness when palpating around olecranon process/olecranon bursitis with appreciable fluid collection in the bursa noted. NVI. Sensation intact proximally to distally. Full ROM of the elbow, wrist, and fingers without difficulty. Radial and ulnar pulses strong and regular. Intact capillary refill. DATA:  CBC:   Lab Results   Component Value Date    WBC 9.7 01/16/2022    HGB 15.0 01/16/2022     01/16/2022     BMP:    Lab Results   Component Value Date     01/16/2022    K 3.8 01/16/2022     01/16/2022    CO2 27 01/16/2022    BUN 11 01/16/2022    CREATININE 1.3 01/16/2022    GLUCOSE 104 01/16/2022     PT/INR:  No results found for: PROTIME, INR  Troponin:  No results found for: TROPONINI    Radiology: Xrays Left Elbow:    1. There is soft tissue swelling along the dorsal aspect of the mid and proximal left humerus on the lateral view as well as overlying the olecranon which may represent cellulitis. Cannot exclude underlying olecranon bursitis. 2. No acute fractures seen. No periosteal reaction or osseous erosions are identified. ASSESSMENT:Active Problems:    Cellulitis  Resolved Problems:    * No resolved hospital problems.  * PLAN:  As discussed with Dr Angel Noble, ortho attending surgeon, at this time we recommend close monitoring and continued IV antibiotic therapy. If the erythema, swelling, and pain worsen then may need to consider surgical intervention with possible incision and drainage of the left elbow/forearm. Recommend aggressive ice and elevation of the left upper extremity as well as encouraging gentle range of motion of the elbow, wrist, and fingers. Plan was discussed in full detail with the patient and he agrees. This case was discussed with Dr Angel Noble and he agrees with the above mentioned findings and plan at this time. We will closely monitor the patient over the next few days to ensure that there is improvement with the use of antibiotics and to determine if surgery is needed. We will keep him NPO after midnight in anticipation of possible surgical intervention. Jada Levin PA-C      Patient care discussed with Martine Tijerina PA-C. Agree with assessment and plan. At this time we will continue IV antibiotics. We will continue to monitor clinically. He may require an I&D. N.p.o. at midnight.     Vincent Jackson MD  Orthopaedic Surgeon  [facility]  1/17/2022  7:03 PM

## 2022-01-17 NOTE — ANESTHESIA PRE PROCEDURE
Department of Anesthesiology  Preprocedure Note       Name:  Johanna Rod   Age:  24 y.o.  :  2000                                          MRN:  [id number]         Date:  2022      Surgeon: Lori Christine):  Megan Ross MD    Procedure: Procedure(s):  I & D LEFT ELBOW    Medications prior to admission:   Prior to Admission medications    Medication Sig Start Date End Date Taking?  Authorizing Provider   ibuprofen (ADVIL;MOTRIN) 800 MG tablet Take 800 mg by mouth every 6 hours as needed for Pain   Yes Historical Provider, MD   sulfamethoxazole-trimethoprim (BACTRIM DS;SEPTRA DS) 800-160 MG per tablet Take 1 tablet by mouth 2 times daily   Yes Historical Provider, MD       Current medications:    Current Facility-Administered Medications   Medication Dose Route Frequency Provider Last Rate Last Admin    vancomycin (VANCOCIN) intermittent dosing (placeholder)   Other RX Placeholder [address]        sodium chloride flush 0.9 % injection 5-40 mL  5-40 mL IntraVENous 2 times per day BETHEL Gant - CNP   10 mL at 22 2323    sodium chloride flush 0.9 % injection 5-40 mL  5-40 mL IntraVENous PRN BETHEL Abdi CNP        0.9 % sodium chloride infusion  25 mL IntraVENous PRN BETHEL Gant - CNP   Stopped at 22 0026    ondansetron (ZOFRAN-ODT) disintegrating tablet 4 mg  4 mg Oral Q8H PRN Claudio Vivar APRN - CNP        Or    ondansetron (ZOFRAN) injection 4 mg  4 mg IntraVENous Q6H PRN BETHEL Abdi - CNP        polyethylene glycol (GLYCOLAX) packet 17 g  17 g Oral Daily PRN Claudio Pearsonys, APRN - CNP        acetaminophen (TYLENOL) tablet 650 mg  650 mg Oral Q6H PRN Claudio Pearsonys, APRN - CNP   650 mg at 22 0258    Or    acetaminophen (TYLENOL) suppository 650 mg  650 mg Rectal Q6H PRN Claudio Vivar, APRN - CNP        potassium chloride (KLOR-CON M) extended release tablet 40 mEq  40 mEq Oral PRN Claudio Vivar, APRN - CNP        Or    potassium bicarb-citric acid (EFFER-K) effervescent tablet 40 mEq  40 mEq Oral PRN Sandoval Ream, APRN - CNP        Or    potassium chloride 10 mEq/100 mL IVPB (Peripheral Line)  10 mEq IntraVENous PRN Sandoval Ream, APRN - CNP        magnesium sulfate 2000 mg in 50 mL IVPB premix  2,000 mg IntraVENous PRN Ni A Rethman, APRN - CNP        HYDROcodone-acetaminophen (NORCO) 5-325 MG per tablet 1 tablet  1 tablet Oral Q4H PRN Ni Miranda Malcolm, APRN - CNP        Or    HYDROcodone-acetaminophen (NORCO) 5-325 MG per tablet 2 tablet  2 tablet Oral Q4H PRN Ni A Rethman, APRN - CNP        ceFAZolin (ANCEF) 1000 mg in dextrose 5 % 50 mL IVPB (premix)  1,000 mg IntraVENous Q8H Ni A Rethman, APRN - CNP   Stopped at 01/17/22 3099       Allergies:  No Known Allergies    Problem List:    Patient Active Problem List   Diagnosis Code    Cellulitis L03.90       Past Medical History:  History reviewed. No pertinent past medical history.     Past Surgical History:        Procedure Laterality Date    KNEE ARTHROSCOPY Right 04/2021       Social History:    Social History     Tobacco Use    Smoking status: Never Smoker    Smokeless tobacco: Never Used   Substance Use Topics    Alcohol use: Yes     Comment: socially                                Counseling given: Not Answered      Vital Signs (Current):   Vitals:    01/16/22 2341 01/17/22 0245 01/17/22 0828 01/17/22 1132   BP: 126/61 118/69 119/64 127/65   Pulse: 95 85 69 78   Resp: 16 16 16 16   Temp: 99.3 °F (37.4 °C) 100.5 °F (38.1 °C) 98.6 °F (37 °C) 98.7 °F (37.1 °C)   TempSrc: Oral Oral Oral Oral   SpO2: 95% 97% 97% 98%   Weight:       Height:                                                  BP Readings from Last 3 Encounters:   01/17/22 127/65       NPO Status:                                                                                 BMI:   Wt Readings from Last 3 Encounters:   01/16/22 240 lb (108.9 kg)     Body mass index is 32.55 kg/m². CBC:   Lab Results   Component Value Date    WBC 8.2 01/17/2022    RBC 4.50 01/17/2022    HGB 14.0 01/17/2022    HCT 42.5 01/17/2022    MCV 94.4 01/17/2022    RDW 11.8 01/16/2022     01/17/2022       CMP:   Lab Results   Component Value Date     01/17/2022    K 3.7 01/17/2022     01/17/2022    CO2 24 01/17/2022    BUN 10 01/17/2022    CREATININE 1.0 01/17/2022    LABGLOM >90 01/17/2022    GLUCOSE 116 01/17/2022    PROT 7.9 01/16/2022    CALCIUM 9.0 01/17/2022    BILITOT 1.6 01/16/2022    ALKPHOS 68 01/16/2022    AST 20 01/16/2022    ALT 40 01/16/2022       POC Tests: No results for input(s): POCGLU, POCNA, POCK, POCCL, POCBUN, POCHEMO, POCHCT in the last 72 hours. Coags: No results found for: PROTIME, INR, APTT    HCG (If Applicable): No results found for: PREGTESTUR, PREGSERUM, HCG, HCGQUANT     ABGs: No results found for: PHART, PO2ART, BJM8IEY, QJQ3CZO, BEART, X9YLFIAT     Type & Screen (If Applicable):  No results found for: LABABO, LABRH    Drug/Infectious Status (If Applicable):  No results found for: HIV, HEPCAB    COVID-19 Screening (If Applicable): No results found for: COVID19        Anesthesia Evaluation  Patient summary reviewed and Nursing notes reviewed no history of anesthetic complications:   Airway: Mallampati: II        Dental:          Pulmonary: breath sounds clear to auscultation                             Cardiovascular:  Exercise tolerance: good (>4 METS),           Rhythm: regular  Rate: normal                    Neuro/Psych:               GI/Hepatic/Renal:             Endo/Other:                     Abdominal:       Abdomen: soft. Vascular: Other Findings:             Anesthesia Plan      general     ASA 1 - emergent       Induction: intravenous. MIPS: Postoperative opioids intended and Prophylactic antiemetics administered. Anesthetic plan and risks discussed with patient.       Plan discussed with

## 2022-01-17 NOTE — PROGRESS NOTES
Hospitalist Progress Note    Patient:  Medardo Ingram      Unit/Bed:6E-67/067-A    YOB: 2000    MRN: [id number]       Acct: [de-identified]     PCP: BETHEL Wasserman CNP    Date of Admission: 1/16/2022    Assessment/Plan:    1. Sepsis secondary to cellulitis left elbow (POA), failed outpatient treatment with Bactrim--2 blood cultures have been obtained; x-ray left elbow reveals soft tissue swelling along the dorsal aspect of the mid and proximal left humerus on the lateral view as well possible olecranial bursitis; vancomycin 1/16;  Ancef 1/16; appreciate orthopedic input; blood cultures pending; will consult infectious disease as marked area shows increased redness and edema; CRP on 1/16 was 17.24     Expected discharge date: Per clinical course    Disposition:    [x] Home       [] TCU       [] Rehab       [] Psych       [] SNF       [] [place]       [] Other-    Chief Complaint: Left elbow redness and swelling    Hospital Course:  24 y.o. male who presented to [address] [place] [address] with left elbow redness and swelling; a few days ago patient noticed that his left elbow had redness and warmth along with swelling; he was seen at an urgent care center yesterday and was started on Bactrim and had 2 doses; mother marked the area and today the redness increased so patient was taken to urgent care, patient was given vancomycin x1 dose and was sent here for further evaluation; patient is currently sitting up in the bed, complaining of left elbow pain rates 5 out of 10, area is swollen, reddened and warm to the touch, he does have full range of motion noted; he denies any medical problems, denies any injury, denies any known insect bite; he is being admitted to hospital service for further care and evaluation.     1/17--> T-max 103.1, redness and swelling has increased; orthopedics saw and monitoring    Subjective (past 24 hours): Left elbow area rates 3 out of 10, denies any other complaints      Medications:  Reviewed    Infusion Medications    sodium chloride Stopped (01/17/22 0026)     Scheduled Medications    sodium chloride flush  5-40 mL IntraVENous 2 times per day    ceFAZolin  1,000 mg IntraVENous Q8H     PRN Meds: sodium chloride flush, sodium chloride, ondansetron **OR** ondansetron, polyethylene glycol, acetaminophen **OR** acetaminophen, potassium chloride **OR** potassium alternative oral replacement **OR** potassium chloride, magnesium sulfate, HYDROcodone 5 mg - acetaminophen **OR** HYDROcodone 5 mg - acetaminophen      Intake/Output Summary (Last 24 hours) at 1/17/2022 0706  Last data filed at 1/17/2022 0257  Gross per 24 hour   Intake 1403.58 ml   Output --   Net 1403.58 ml       Diet:  Diet NPO    Exam:  /69   Pulse 85   Temp 100.5 °F (38.1 °C) (Oral)   Resp 16   Ht 6' (1.829 m)   Wt 240 lb (108.9 kg)   SpO2 97%   BMI 32.55 kg/m²     General appearance: No apparent distress, appears stated age and cooperative. HEENT: Pupils equal, round, and reactive to light. Conjunctivae/corneas clear. Neck: Supple, with full range of motion. No jugular venous distention. Trachea midline. Respiratory:  Normal respiratory effort. Clear to auscultation, bilaterally without Rales/Wheezes/Rhonchi. Cardiovascular: Regular rate and rhythm with normal S1/S2 without murmurs, rubs or gallops. Abdomen: Soft, non-tender, non-distended with normal bowel sounds. Musculoskeletal: passive and active ROM x 4 extremities~left elbow area with increased redness past marked area along with swelling and warmth. Skin: Skin color, texture, turgor normal.    Neurologic:  Neurovascularly intact without any focal sensory/motor deficits.  Cranial nerves: II-XII intact, grossly non-focal.  Psychiatric: Alert and oriented, thought content appropriate  Capillary Refill: Brisk,< 3 seconds   Peripheral Pulses: +2 palpable, equal bilaterally       Labs:   Recent Labs 01/16/22  1050   WBC 9.7   HGB 15.0   HCT 46.1        Recent Labs     01/16/22  1050      K 3.8      CO2 27   BUN 11   CREATININE 1.3     Recent Labs     01/16/22  1050   AST 20   ALT 40   BILITOT 1.6*   ALKPHOS 68     Microbiology:    2 blood cultures pending no growth to date    Radiology:  XR ELBOW LEFT (MIN 3 VIEWS)    Result Date: 1/16/2022  PROCEDURE: XR ELBOW LEFT (MIN 3 VIEWS) CLINICAL INFORMATION: left elbow cellulitis,fever COMPARISON: No prior study. TECHNIQUE:  Left elbow 4 views  FINDINGS: There is soft tissue swelling along the dorsal aspect of the mid and proximal left humerus on the lateral view as well as overlying the olecranon which may represent cellulitis. Cannot exclude underlying olecranon bursitis. No acute fractures seen. No periosteal reaction or osseous erosions are identified. 1. There is soft tissue swelling along the dorsal aspect of the mid and proximal left humerus on the lateral view as well as overlying the olecranon which may represent cellulitis. Cannot exclude underlying olecranon bursitis. 2. No acute fractures seen. No periosteal reaction or osseous erosions are identified. **This report has been created using voice recognition software. It may contain minor errors which are inherent in voice recognition technology. ** Final report electronically signed by Dr. Mannie Nogueira on 1/16/2022 11:10 AM      DVT prophylaxis: [] Lovenox                                 [x] SCDs                                 [] SQ Heparin                                 [x] Encourage ambulation           [] Already on Anticoagulation     Code Status: Full Code    PT/OT Eval Status: Ambulate    Tele:   [] yes             [x] no    Active Hospital Problems    Diagnosis Date Noted    Cellulitis [L03.90] 01/16/2022       Electronically signed by BETHEL Erickson CNP on 1/17/2022 at 7:06 AM

## 2022-01-17 NOTE — CARE COORDINATION
1/17/22, 1:04 PM EST  DISCHARGE PLANNING EVALUATION:    Pili Garcia       Admitted: 1/16/2022/ 40 [address] day: 1   Location: 6E-67/06-A Reason for admit: Cellulitis [L03.90]  Cellulitis of forearm, left [N50.414]   PMH:  has no past medical history on file. Procedure: 1-17-22 Left olecranon bursectomy arthroscopic  Barriers to Discharge: To ER with left elbow redness. Taking bactrim but apparently has worsened. Presently is afebrile. Ancef and Vanc. ID consulted. Orthopedics consulted. PCP: BETHEL Garcia CNP  Readmission Risk Score: 3.6 ( )%    Patient Goals/Plan/Treatment Preferences: Met with pt mother today while pt in procedure. From home with no services or DME. He has a PCP, he drives and no difficulty getting meds. CM to follow for needs. Transportation/Food Security/Housekeeping Addressed:  No issues identified.

## 2022-01-17 NOTE — OP NOTE
Operative Note      Patient: Brian Jamison  YOB: 2000  MRN: [id number]    Date of Procedure: 1/17/2022    Pre-Op Diagnosis: Left elbow septic bursitis    Post-Op Diagnosis: Same       Procedure: Left olecranon bursectomy arthroscopic    Surgeon(s):  Rach Gama MD    Assistant:   Physician Assistant: Ruddy Maldonado PA-C    Anesthesia: General    Estimated Blood Loss (mL): less than 50     Complications: None    Specimens:   ID Type Source Tests Collected by Time Destination   1 : A&A CULTURE LEFT ELBOW Tissue Elbow CULTURE, ANAEROBIC AND AEROBIC Rach Gama MD 1/17/2022 1313        Implants:  * No implants in log *      Drains: * No LDAs found *    Findings: Gross purulence    Detailed Description of Procedure: Indications  This 40-year-old developed an olecranon bursitis for unknown reason. Been on antibiotics for a day or 2 has not improved and actually gotten worse. Redness minimal fluctuance. Aspiration was attempted but really no fluid was obtained. Felt he would benefit from arthroscopic bursectomy to expedite his treatment. Patient and family agreed. Narrative  Patient taken the operating room underwent general anesthetic. Left upper extremity was prepped draped in a sterile fashion. Timeout was taken consent was confirmed. Started with a small portal over the left process. Cannula was utilized to mobilize there is gross fluid was obtained this was sent off for culture. We then made another incision more distally inserted at shaver. We able to get both of these of the bursa. Camera with fluid and the shaver into the bursal area and then we did a bursectomy using a 4.0 mm shaver. Once we felt like we completely broke down any loculations and fluid and ran through the significantly we then stopped. Dry dressings were applied. Wounds were allowed to stay open. Patient is then awakened turned to cover in good condition.     Postoperative plan  Weightbearing as tolerated. Dry dressings as necessary. Antibiotics per medical services. We will see him in the office in 2 to 3 weeks for clinical exam no sutures will be removed.     Electronically signed by Siri Cox MD on 1/17/2022 at 1:29 PM

## 2022-01-18 PROCEDURE — 1200000000 HC SEMI PRIVATE

## 2022-01-18 PROCEDURE — 99232 SBSQ HOSP IP/OBS MODERATE 35: CPT | Performed by: NURSE PRACTITIONER

## 2022-01-18 PROCEDURE — 2580000003 HC RX 258: Performed by: NURSE PRACTITIONER

## 2022-01-18 PROCEDURE — 6360000002 HC RX W HCPCS: Performed by: NURSE PRACTITIONER

## 2022-01-18 RX ADMIN — CEFAZOLIN SODIUM 1000 MG: 1 INJECTION, SOLUTION INTRAVENOUS at 06:07

## 2022-01-18 RX ADMIN — CEFAZOLIN SODIUM 1000 MG: 1 INJECTION, SOLUTION INTRAVENOUS at 22:35

## 2022-01-18 RX ADMIN — SODIUM CHLORIDE, PRESERVATIVE FREE 10 ML: 5 INJECTION INTRAVENOUS at 22:36

## 2022-01-18 RX ADMIN — CEFAZOLIN SODIUM 1000 MG: 1 INJECTION, SOLUTION INTRAVENOUS at 13:57

## 2022-01-18 ASSESSMENT — PAIN SCALES - GENERAL: PAINLEVEL_OUTOF10: 3

## 2022-01-18 NOTE — PROGRESS NOTES
Orthopaedic Progress Note      SUBJECTIVE:    Chief Complaint   Patient presents with    Cellulitis     left elbow redness, has had 2 doses of bactrim and redness is worse. POD 1 arthroscopic bursectomy left elbow  Cultures negative  Notes improved pain, motion is maintained. Physical    Vitals:    01/18/22 0839   BP: 128/72   Pulse: 71   Resp: 16   Temp: 98.6 °F (37 °C)   SpO2: 94%         OBJECTIVE  Left elbow dressing c/d/i. No drainage from wounds. Erythema and warmth improved. Full stable, pain free motion of the left elbow, wrist and shoulder. Soft compartments.  SILT      Data  CBC:   Lab Results   Component Value Date    WBC 8.2 01/17/2022    RBC 4.50 01/17/2022    HGB 14.0 01/17/2022    HCT 42.5 01/17/2022    MCV 94.4 01/17/2022    MCH 31.1 01/17/2022    MCHC 32.9 01/17/2022    RDW 11.8 01/16/2022     01/17/2022    MPV 10.5 01/17/2022     BMP:    Lab Results   Component Value Date     01/17/2022    K 3.7 01/17/2022     01/17/2022    CO2 24 01/17/2022    BUN 10 01/17/2022    LABALBU 4.0 01/16/2022    CREATININE 1.0 01/17/2022    CALCIUM 9.0 01/17/2022    LABGLOM >90 01/17/2022    GLUCOSE 116 01/17/2022     Uric Acid:  No components found for: URIC  PT/INR:  No results found for: PROTIME, INR  PTT:  No results found for: APTT, PTT[APTT  Troponin:  No results found for: TROPONINI  Urine Culture:  No components found for: CURINE    Current Inpatient Medications    Current Facility-Administered Medications: morphine (PF) injection 2 mg, 2 mg, IntraVENous, Q2H PRN **OR** morphine (PF) injection 4 mg, 4 mg, IntraVENous, Q2H PRN  sodium chloride flush 0.9 % injection 5-40 mL, 5-40 mL, IntraVENous, 2 times per day  sodium chloride flush 0.9 % injection 5-40 mL, 5-40 mL, IntraVENous, PRN  0.9 % sodium chloride infusion, 25 mL, IntraVENous, PRN  ondansetron (ZOFRAN-ODT) disintegrating tablet 4 mg, 4 mg, Oral, Q8H PRN **OR** ondansetron (ZOFRAN) injection 4 mg, 4 mg, IntraVENous, Q6H PRN  polyethylene glycol (GLYCOLAX) packet 17 g, 17 g, Oral, Daily PRN  acetaminophen (TYLENOL) tablet 650 mg, 650 mg, Oral, Q6H PRN **OR** acetaminophen (TYLENOL) suppository 650 mg, 650 mg, Rectal, Q6H PRN  potassium chloride (KLOR-CON M) extended release tablet 40 mEq, 40 mEq, Oral, PRN **OR** potassium bicarb-citric acid (EFFER-K) effervescent tablet 40 mEq, 40 mEq, Oral, PRN **OR** potassium chloride 10 mEq/100 mL IVPB (Peripheral Line), 10 mEq, IntraVENous, PRN  magnesium sulfate 2000 mg in 50 mL IVPB premix, 2,000 mg, IntraVENous, PRN  HYDROcodone-acetaminophen (NORCO) 5-325 MG per tablet 1 tablet, 1 tablet, Oral, Q4H PRN **OR** HYDROcodone-acetaminophen (NORCO) 5-325 MG per tablet 2 tablet, 2 tablet, Oral, Q4H PRN  ceFAZolin (ANCEF) 1000 mg in dextrose 5 % 50 mL IVPB (premix), 1,000 mg, IntraVENous, Q8H    .    ASSESSMENT AND PLAN  WBAT LUE  Follow cultures. ID to recommend abx at [place], likely DC tomorrow.

## 2022-01-18 NOTE — CARE COORDINATION
Discharge Planning Update: Following for septic cellulitis left elbow. Arthroscope per ortho yesterday. Temp 101.4 last yady. This am 98.6. Ancef at present. ID following for recs. Possible discharge tomorrow. Plans return home with family.

## 2022-01-18 NOTE — PROGRESS NOTES
Progress note: Infectious diseases    Patient - Elen Cao,  Age - 24 y.o.    - 2000      Room Number - 6E-67/067-A   N -  [id number]   Acct # - [de-identified]  Date of Admission -  2022 10:13 AM    SUBJECTIVE:   He has no new complaints  OBJECTIVE   VITALS    height is 6' (1.829 m) and weight is 240 lb (108.9 kg). His oral temperature is 98.6 °F (37 °C). His blood pressure is 128/72 and his pulse is 71. His respiration is 16 and oxygen saturation is 94%.        Wt Readings from Last 3 Encounters:   22 240 lb (108.9 kg)       I/O (24 Hours)    Intake/Output Summary (Last 24 hours) at 2022 0954  Last data filed at 2022 0305  Gross per 24 hour   Intake 1400 ml   Output 5 ml   Net 1395 ml       General Appearance  Awake, alert, oriented,  not  In acute distress  HEENT - normocephalic, atraumatic, pink conjunctiva,  anicteric sclera  Neck - Supple, no mass  Lungs -  Bilateral  air entry, no rhonchi, no wheeze  Cardiovascular - Heart sounds are normal.     Abdomen - soft, not distended, nontender,   Neurologic -oriented  Skin - No bruising or bleeding  Extremities - there is redness on the left elbow area, non tender, the swelling is less    MEDICATIONS:      sodium chloride flush  5-40 mL IntraVENous 2 times per day    ceFAZolin  1,000 mg IntraVENous Q8H      sodium chloride Stopped (22 0026)     morphine **OR** morphine, sodium chloride flush, sodium chloride, ondansetron **OR** ondansetron, polyethylene glycol, acetaminophen **OR** acetaminophen, potassium chloride **OR** potassium alternative oral replacement **OR** potassium chloride, magnesium sulfate, HYDROcodone 5 mg - acetaminophen **OR** HYDROcodone 5 mg - acetaminophen      LABS:     CBC:   Recent Labs     22  1050 22  0658   WBC 9.7 8.2   HGB 15.0 14.0    186     BMP:    Recent Labs     22  1050 01/17/22  0658    136   K 3.8 3.7    102   CO2 27 24   BUN 11 10   CREATININE 1.3 1.0   GLUCOSE 104 116*     Calcium:  Recent Labs     01/17/22  0658   CALCIUM 9.0    Hepatic:   Recent Labs     01/16/22  1050   ALKPHOS 68   ALT 40   AST 20   PROT 7.9   BILITOT 1.6*   LABALBU 4.0        CULTURES:   UA: No results for input(s): SPECGRAV, PHUR, COLORU, CLARITYU, MUCUS, PROTEINU, BLOODU, RBCUA, WBCUA, BACTERIA, NITRU, GLUCOSEU, BILIRUBINUR, UROBILINOGEN, KETUA, LABCAST, LABCASTTY, AMORPHOS in the last 72 hours.     Invalid input(s): CRYSTALS  Micro:   Lab Results   Component Value Date    BC No growth-preliminary  01/16/2022        Problem list of patient:     Patient Active Problem List   Diagnosis Code    Cellulitis L03.90         ASSESSMENT/PLAN   Left elbow bursites  Continue current treatment  Will plan discharge at am if ok with Sherine Gee MD, MD, Roxanna Bautista 1/18/2022 9:54 AM

## 2022-01-18 NOTE — CONSULTS
[address] [id number]                                  CONSULTATION    PATIENT NAME: Juan Folres                  :        2000  MED REC NO:   [id number]                           ROOM:       0067  ACCOUNT NO:   [de-identified]                           ADMIT DATE: 2022  PROVIDER:     Salas Ernst. Nas Cross M.D.    Sterling Terryll:  2022    HISTORY OF PRESENT ILLNESS:  He is a 69-year-old male patient, admitted  to the hospital due to pain and swelling in his left elbow. It started  last week, Friday, and over the weekend, it got worse to the point that  he had trouble using it. The redness extended to the upper arm and  forearm. It all started on the elbow area. He did not have any issue  with bending his elbow nor similar history before. He is right-handed. He works in office. He was admitted for left elbow septic bursitis. He  had bursectomy with arthroscopy. Culture was taken. He was empirically  started on broad-spectrum antibiotics. PAST MEDICAL HISTORY:  He does not have significant past history. SOCIAL HISTORY:  He does not smoke. He drinks alcohol occasionally. He  goes to college. PAST SURGICAL HISTORY:  Knee arthroscopy. ALLERGIES:  HE HAS NO KNOWN DRUG ALLERGIES. CURRENT MEDICATIONS:  Include he was on IV Ancef, vancomycin, Tylenol,  Norco, Zofran, polyethylene glycol. REVIEW OF SYSTEMS:  Noncontributory. PHYSICAL EXAMINATION:  VITAL SIGNS:  Temperature 98.4, respirations 16, pulse 76, blood  pressure 127/88. HEENT:  He has pink conjunctivae. Anicteric sclerae. CHEST:  Bilateral air entry. CARDIOVASCULAR SYSTEM:  Regular. ABDOMEN:  Soft. EXTREMITIES:  He has dressed left knee, post surgery. He has redness  and swelling on his upper arm and forearm. Range of movement was good. CNS:  He is conscious; oriented to person, place, and time. DIAGNOSTICS:  CRP was _____. Sodium 136, potassium 3.7, chloride 102,  bicarb 22_____, BUN 10, creatinine 1. Preliminary Gram stain was  negative. IMPRESSION:  Septic olecranon bursitis, status post bursectomy. PLAN:  To continue IV Ancef. We will stop vancomycin. Preliminary Gram  stain and culture were negative. We will continue to follow the  patient. Thank you for the consultation.         Maddison Zapien M.D.    D: 01/17/2022 18:24:43       T: 01/17/2022 19:42:19     [email or web address]  Job#: [id number]     Doc#: [id number]    CC:

## 2022-01-18 NOTE — PROGRESS NOTES
Hospitalist Progress Note    Patient:  Zaki Villeda      Unit/Bed:6E-67/067-A    YOB: 2000    MRN: [id number]       Acct: [de-identified]     PCP: BETHEL Mauro CNP    Date of Admission: 1/16/2022    Assessment/Plan:    1. Sepsis secondary to cellulitis left elbow (POA), failed outpatient treatment with Bactrim S/P left olecranial bursectomy arthroscope on 1/17/2022--2 blood cultures have been obtained; x-ray left elbow reveals soft tissue swelling along the dorsal aspect of the mid and proximal left humerus on the lateral view as well possible olecranial bursitis; vancomycin 1/16-1/17;  Ancef 1/16; appreciate orthopedic input; blood cultures showing no growth to date; infectious disease input appreciated; CRP on 1/16 was 17.24     Expected discharge date: Per clinical course    Disposition:    [x] Home       [] TCU       [] Rehab       [] Psych       [] SNF       [] [place]       [] Other-    Chief Complaint: Left elbow redness and swelling    Hospital Course:  24 y.o. male who presented to [address] [place] [address] with left elbow redness and swelling; a few days ago patient noticed that his left elbow had redness and warmth along with swelling; he was seen at an urgent care center yesterday and was started on Bactrim and had 2 doses; mother marked the area and today the redness increased so patient was taken to urgent care, patient was given vancomycin x1 dose and was sent here for further evaluation; patient is currently sitting up in the bed, complaining of left elbow pain rates 5 out of 10, area is swollen, reddened and warm to the touch, he does have full range of motion noted; he denies any medical problems, denies any injury, denies any known insect bite; he is being admitted to hospital service for further care and evaluation.     1/17--> T-max 103.1, redness and swelling has increased; orthopedics saw and monitoring    1/18--> T-max 101.4, had left olecranial bursectomy arthroscopic done yesterday    Subjective (past 24 hours): Left elbow area rates 4 out of 10, denies any other complaints    Medications:  Reviewed    Infusion Medications    sodium chloride Stopped (01/17/22 0026)     Scheduled Medications    sodium chloride flush  5-40 mL IntraVENous 2 times per day    ceFAZolin  1,000 mg IntraVENous Q8H     PRN Meds: morphine **OR** morphine, sodium chloride flush, sodium chloride, ondansetron **OR** ondansetron, polyethylene glycol, acetaminophen **OR** acetaminophen, potassium chloride **OR** potassium alternative oral replacement **OR** potassium chloride, magnesium sulfate, HYDROcodone 5 mg - acetaminophen **OR** HYDROcodone 5 mg - acetaminophen      Intake/Output Summary (Last 24 hours) at 1/18/2022 0651  Last data filed at 1/18/2022 0305  Gross per 24 hour   Intake 1400 ml   Output 5 ml   Net 1395 ml       Diet:  ADULT DIET; Regular    Exam:  /82   Pulse 66   Temp 98.6 °F (37 °C) (Oral)   Resp 16   Ht 6' (1.829 m)   Wt 240 lb (108.9 kg)   SpO2 96%   BMI 32.55 kg/m²     General appearance: No apparent distress, appears stated age and cooperative. HEENT: Pupils equal, round, and reactive to light. Conjunctivae/corneas clear. Neck: Supple, with full range of motion. No jugular venous distention. Trachea midline. Respiratory:  Normal respiratory effort. Clear to auscultation, bilaterally without Rales/Wheezes/Rhonchi. Cardiovascular: Regular rate and rhythm with normal S1/S2 without murmurs, rubs or gallops. Abdomen: Soft, non-tender, non-distended with normal bowel sounds. Musculoskeletal: passive and active ROM x 4 extremities~left elbow area with Ace dressing/bandage in place, wiggles all fingers, great left radial pulse palpated  Skin: Skin color, texture, turgor normal.    Neurologic:  Neurovascularly intact without any focal sensory/motor deficits.  Cranial nerves: II-XII intact, grossly non-focal.  Psychiatric: Alert and oriented, thought content appropriate  Capillary Refill: Brisk,< 3 seconds   Peripheral Pulses: +2 palpable, equal bilaterally       Labs:   Recent Labs     01/16/22  1050 01/17/22  0658   WBC 9.7 8.2   HGB 15.0 14.0   HCT 46.1 42.5    186     Recent Labs     01/16/22  1050 01/17/22  0658    136   K 3.8 3.7    102   CO2 27 24   BUN 11 10   CREATININE 1.3 1.0   CALCIUM  --  9.0     Recent Labs     01/16/22  1050   AST 20   ALT 40   BILITOT 1.6*   ALKPHOS 68     Microbiology:    2 blood cultures pending no growth to date    Radiology:  XR ELBOW LEFT (MIN 3 VIEWS)    Result Date: 1/16/2022  PROCEDURE: XR ELBOW LEFT (MIN 3 VIEWS) CLINICAL INFORMATION: left elbow cellulitis,fever COMPARISON: No prior study. TECHNIQUE:  Left elbow 4 views  FINDINGS: There is soft tissue swelling along the dorsal aspect of the mid and proximal left humerus on the lateral view as well as overlying the olecranon which may represent cellulitis. Cannot exclude underlying olecranon bursitis. No acute fractures seen. No periosteal reaction or osseous erosions are identified. 1. There is soft tissue swelling along the dorsal aspect of the mid and proximal left humerus on the lateral view as well as overlying the olecranon which may represent cellulitis. Cannot exclude underlying olecranon bursitis. 2. No acute fractures seen. No periosteal reaction or osseous erosions are identified. **This report has been created using voice recognition software. It may contain minor errors which are inherent in voice recognition technology. ** Final report electronically signed by Dr. Andrew Burr on 1/16/2022 11:10 AM      DVT prophylaxis: [] Lovenox                                 [x] SCDs                                 [] SQ Heparin                                 [x] Encourage ambulation           [] Already on Anticoagulation     Code Status: Full Code    PT/OT Eval Status: Ambulate    Tele:   [] yes             [x] no    Active Hospital Problems    Diagnosis Date Noted    Cellulitis [L03.90] 01/16/2022       Electronically signed by BETHEL Silver CNP on 1/18/2022 at 6:51 AM

## 2022-01-19 VITALS
OXYGEN SATURATION: 97 % | SYSTOLIC BLOOD PRESSURE: 125 MMHG | HEIGHT: 72 IN | TEMPERATURE: 98.3 F | RESPIRATION RATE: 16 BRPM | BODY MASS INDEX: 32.51 KG/M2 | DIASTOLIC BLOOD PRESSURE: 62 MMHG | HEART RATE: 68 BPM | WEIGHT: 240 LBS

## 2022-01-19 PROCEDURE — 99238 HOSP IP/OBS DSCHRG MGMT 30/<: CPT | Performed by: FAMILY MEDICINE

## 2022-01-19 PROCEDURE — 6360000002 HC RX W HCPCS: Performed by: NURSE PRACTITIONER

## 2022-01-19 RX ORDER — CEPHALEXIN 500 MG/1
500 CAPSULE ORAL 3 TIMES DAILY
Qty: 30 CAPSULE | Refills: 0 | Status: SHIPPED | OUTPATIENT
Start: 2022-01-19 | End: 2022-01-29

## 2022-01-19 RX ADMIN — CEFAZOLIN SODIUM 1000 MG: 1 INJECTION, SOLUTION INTRAVENOUS at 09:12

## 2022-01-19 NOTE — PROGRESS NOTES
Discharge instructions given to patient and mom and they verbalize understanding of instructions, medications, wound care and follow up appointment with Dr. Nasir Mancilla

## 2022-01-19 NOTE — CARE COORDINATION
1/19/22, 12:59 PM EST    Patient goals/plan/ treatment preferences discussed by  and . Patient goals/plan/ treatment preferences reviewed with patient/ family. Patient/ family verbalize understanding of discharge plan and are in agreement with goal/plan/treatment preferences. Understanding was demonstrated using the teach back method. AVS provided by RN at time of discharge, which includes all necessary medical information pertaining to the patients current course of illness, treatment, post-discharge goals of care, and treatment preferences. Plans return home with family and no new services.

## 2022-01-19 NOTE — PROGRESS NOTES
Progress note: Infectious diseases    Patient - Dale Joaquin,  Age - 24 y.o.    - 2000      Room Number - 6E-67/067-A   MRN -  [id number]   Acct # - [de-identified]  Date of Admission -  2022 10:13 AM    SUBJECTIVE:   He is feeling good, the swelling is much improved. OBJECTIVE   VITALS    height is 6' (1.829 m) and weight is 240 lb (108.9 kg). His oral temperature is 98.3 °F (36.8 °C). His blood pressure is 125/62 and his pulse is 68. His respiration is 16 and oxygen saturation is 97%.        Wt Readings from Last 3 Encounters:   22 240 lb (108.9 kg)       I/O (24 Hours)    Intake/Output Summary (Last 24 hours) at 2022 1404  Last data filed at 2022 0410  Gross per 24 hour   Intake 1304.72 ml   Output --   Net 1304.72 ml       General Appearance  Awake, alert, oriented,  not  In acute distress  HEENT - normocephalic, atraumatic, pink conjunctiva,  anicteric sclera  Neck - Supple, no mass  Lungs -  Bilateral  air entry, no rhonchi, no wheeze  Cardiovascular - Heart sounds are normal.     Abdomen - soft, not distended, nontender,   Neurologic -oriented  Skin - No bruising or bleeding  Extremities - the swelling and redness is much improved    MEDICATIONS:      sodium chloride flush  5-40 mL IntraVENous 2 times per day    ceFAZolin  1,000 mg IntraVENous Q8H      sodium chloride Stopped (22 0026)     morphine **OR** morphine, sodium chloride flush, sodium chloride, ondansetron **OR** ondansetron, polyethylene glycol, acetaminophen **OR** acetaminophen, potassium chloride **OR** potassium alternative oral replacement **OR** potassium chloride, magnesium sulfate, HYDROcodone 5 mg - acetaminophen **OR** HYDROcodone 5 mg - acetaminophen      LABS:     CBC:   Recent Labs     22  0658   WBC 8.2   HGB 14.0        BMP:    Recent Labs     22  0658      K 3.7      CO2 24 BUN 10   CREATININE 1.0   GLUCOSE 116*     Calcium:  Recent Labs     01/17/22  0658   CALCIUM 9.0    Hepatic:   No results for input(s): ALKPHOS, ALT, AST, PROT, BILITOT, BILIDIR, LABALBU in the last 72 hours. CULTURES:   UA: No results for input(s): SPECGRAV, PHUR, COLORU, CLARITYU, MUCUS, PROTEINU, BLOODU, RBCUA, WBCUA, BACTERIA, NITRU, GLUCOSEU, BILIRUBINUR, UROBILINOGEN, KETUA, LABCAST, LABCASTTY, AMORPHOS in the last 72 hours. Invalid input(s): CRYSTALS  Micro:   Lab Results   Component Value Date    BC No growth-preliminary  01/16/2022        Problem list of patient:     Patient Active Problem List   Diagnosis Code    Cellulitis L03.90         ASSESSMENT/PLAN   Left elbow bursites  Ok with discharge plan   Tay e-scribed to his pharmacy  Follow up as needed.     Grey Quick MD, MD, FACP 1/19/2022 2:04 PM

## 2022-01-19 NOTE — DISCHARGE SUMMARY
Hospitalist Discharge Summary     Patient Identification:  Virginie Degroot  : 2000  MRN: [id number]   Account: [de-identified]     Admit date: 2022  Discharge date: 22  Attending provider: Ry Landin MD        Primary care provider: BETHEL Morales - CNP     Discharge Diagnoses:   1. Left elbow septic bursitis s/p left arthroscopic olecranon bursectomy 22 by Dr. Minh Conn  2. Sepsis with fever, tachycardia, left elbow septic bursitis - POA  3. Macrocytosis  4. Elevated bilirubin  5. Obesity Body mass index is 32.55 kg/m². Hospital Course:   Virginie Degroot is a 24 y.o. male admitted to [facility] on 2022 for cellulitis of left elbow. See H&P by BETHEL Cheung CNP on 22 for admitting details. Pt only seen day of discharge by this provider. Per HPI per [facility] \"23 y.o. male who presented to [facility] with left elbow redness and swelling; a few days ago patient noticed that his left elbow had redness and warmth along with swelling; he was seen at an urgent care center yesterday and was started on Bactrim and had 2 doses; mother marked the area and today the redness increased so patient was taken to urgent care, patient was given vancomycin x1 dose and was sent here for further evaluation\"    Sepsis secondary to cellulitis/septic bursitis of left elbow (POA) -->  failed outpatient treatment with Bactrim S/P left olecranial bursectomy arthroscopy on 2022 by ortho Dr Minh Conn --> vancomycin -; Ancef  --> ID consulted and per Dr. Stuart Baltazar changed to keflex x 10 days at d/c   -- blood cx  (-) at time of d/c; left elbow cx  (-) at time of d/c   -- x-ray left elbow reveals soft tissue swelling along the dorsal aspect of the mid and proximal left humerus on the lateral view as well possible olecranial bursitis    He was cleared by ID and orthopedic services.   Dr. Stuart Baltazar addressed [facility] for d/c and will be d/c with keflex x 10 more days and will f/u ortho also per instruction. He was HD stable, afebrile, pain and redness left elbow much improved thus was d/c home in stable condition. Discharge Medications:     Medication List      START taking these medications    cephALEXin 500 MG capsule  Commonly known as: KEFLEX  Take 1 capsule by mouth 3 times daily for 10 days     HYDROcodone-acetaminophen 5-325 MG per tablet  Commonly known as: Norco  Take 1-2 tablets by mouth every 6 hours as needed for Pain for up to 7 days. CONTINUE taking these medications    ibuprofen 800 MG tablet  Commonly known as: ADVIL;MOTRIN        STOP taking these medications    sulfamethoxazole-trimethoprim 800-160 MG per tablet  Commonly known as: BACTRIM DS;SEPTRA DS           Where to Get Your Medications      These medications were sent to [facility] , [address]  [address] [place] [address], [address] [address]    Phone: [phone]   · HYDROcodone-acetaminophen 5-325 MG per tablet     These medications were sent to [address], [place] [address]    Phone: [phone]   · cephALEXin 500 MG capsule         Patient Instructions:    Discharge lab work: none  Activity: activity as tolerated and WBAT per ortho of TENA  Diet: ADULT DIET;  Regular    Code Status: Full Code    Follow-up visits:   -- PCP 1 week  -- ortho per recs  -- ID prn    Procedures:   -- 1/17/22 by Dr. Mesa Rang = Procedure: Left olecranon bursectomy arthroscopic    Consults:   IP CONSULT TO ORTHOPEDIC SURGERY  IP CONSULT TO PHARMACY  IP CONSULT TO INFECTIOUS DISEASES      Examination:  Vitals:  Vitals:    01/18/22 2033 01/19/22 0410 01/19/22 0845 01/19/22 1115   BP: 139/68 133/64 139/75 125/62   Pulse: 78 61 78 68   Resp: 16 16 16 16   Temp: 98.2 °F (36.8 °C) 98.1 °F (36.7 °C) 98 °F (36.7 °C) 98.3 °F (36.8 °C)   TempSrc: Oral Oral Oral Oral   SpO2: 98% 99% 97% 97%   Weight:       Height:         Weight: Weight: 240 lb (108.9 kg)     24 hour intake/output:    Intake/Output Summary (Last 24 hours) at 1/19/2022 1425  Last data filed at 1/19/2022 1417  Gross per 24 hour   Intake 1904.72 ml   Output --   Net 1904.72 ml       General appearance - alert, well appearing, and in no distress and oriented to person, place, and time  Chest - clear to auscultation, no wheezes, rales or rhonchi, symmetric air entry, no tachypnea, retractions or cyanosis  Heart - normal rate, regular rhythm, normal S1, S2, no murmurs, rubs, clicks or gallops  Abdomen - soft, nontender, nondistended, no masses or organomegaly  bowel sounds normal  Obese: No; Protuberant: No   Neurological - alert, oriented, normal speech, no focal findings or movement disorder noted, cranial nerves II through XII intact  Extremities - peripheral pulses normal, no pedal edema, no clubbing or cyanosis; Left elbow with bandage w/o shadowing and no erythema but with darkening around elbow smaller than prior drawn line and no warmth to touch and improving ROM and TTP  Skin - left elbow with darkening abound bandage from surgery - no erythema or warmth to left elbow. No other rashes or lesions    Significant Diagnostics:   Radiology:   XR ELBOW LEFT (MIN 3 VIEWS)   Final Result   1. There is soft tissue swelling along the dorsal aspect of the mid and proximal left humerus on the lateral view as well as overlying the olecranon which may represent cellulitis. Cannot exclude underlying olecranon bursitis. 2. No acute fractures seen. No periosteal reaction or osseous erosions are identified. **This report has been created using voice recognition software. It may contain minor errors which are inherent in voice recognition technology. **      Final report electronically signed by Dr. Carmen Bloom on 1/16/2022 11:10 AM          Labs: Recent Results (from the past 72 hour(s))   C-reactive protein    Collection Time: 01/16/22 10:42 PM   Result Value Ref Range    CRP 17.24 (H) 0.00 - 1.00 mg/dl   Sedimentation Rate    Collection Time: 01/16/22 10:42 PM   Result Value Ref Range    Sed Rate 30 (H) 0 - 10 mm/hr   Basic Metabolic Panel w/ Reflex to MG    Collection Time: 01/17/22  6:58 AM   Result Value Ref Range    Sodium 136 135 - 145 meq/L    Potassium reflex Magnesium 3.7 3.5 - 5.2 meq/L    Chloride 102 98 - 111 meq/L    CO2 24 23 - 33 meq/L    Glucose 116 (H) 70 - 108 mg/dL    BUN 10 7 - 22 mg/dL    CREATININE 1.0 0.4 - 1.2 mg/dL    Calcium 9.0 8.5 - 10.5 mg/dL   CBC    Collection Time: 01/17/22  6:58 AM   Result Value Ref Range    WBC 8.2 4.8 - 10.8 thou/mm3    RBC 4.50 (L) 4.70 - 6.10 mill/mm3    Hemoglobin 14.0 14.0 - 18.0 gm/dl    Hematocrit 42.5 42.0 - 52.0 %    MCV 94.4 (H) 80.0 - 94.0 fL    MCH 31.1 26.0 - 33.0 pg    MCHC 32.9 32.2 - 35.5 gm/dl    RDW-CV 12.0 11.5 - 14.5 %    RDW-SD 41.5 35.0 - 45.0 fL    Platelets 159 861 - 914 thou/mm3    MPV 10.5 9.4 - 12.4 fL   Anion Gap    Collection Time: 01/17/22  6:58 AM   Result Value Ref Range    Anion Gap 10.0 8.0 - 16.0 meq/L   Glomerular Filtration Rate, Estimated    Collection Time: 01/17/22  6:58 AM   Result Value Ref Range    Est, Glom Filt Rate >90 ml/min/1.73m2   Culture, Anaerobic and Aerobic    Collection Time: 01/17/22  1:13 PM    Specimen: Elbow; Tissue   Result Value Ref Range    Aerobic Culture No growth-preliminary No growth      Gram Stain Result       Rare segmented neutrophils observed. No epithelial cells observed. No bacteria seen.         Discharge condition: stable  Disposition: Home  Time spent on discharge: 25 min    Electronically signed by Andrade Verdugo MD on 1/19/2022 at 12:56 PM

## 2022-01-22 LAB
AEROBIC CULTURE: NORMAL
ANAEROBIC CULTURE: NO GROWTH
BLOOD CULTURE, ROUTINE: NORMAL
BLOOD CULTURE, ROUTINE: NORMAL
GRAM STAIN RESULT: NORMAL

## (undated) DEVICE — INTENDED FOR TISSUE SEPARATION, AND OTHER PROCEDURES THAT REQUIRE A SHARP SURGICAL BLADE TO PUNCTURE OR CUT.: Brand: BARD-PARKER ® CARBON RIB-BACK BLADES

## (undated) DEVICE — DRAPE,U/SHT,SPLIT,FILM,60X84,STERILE: Brand: MEDLINE

## (undated) DEVICE — DRESSING,GAUZE,XEROFORM,CURAD,5"X9",ST: Brand: CURAD

## (undated) DEVICE — SOLUTION IRRIG 3000ML 0.9% SOD CHL USP UROMATIC PLAS CONT

## (undated) DEVICE — SUTURE ETHLN SZ 3-0 L18IN NONABSORBABLE BLK FS-1 L24MM 3/8 663H

## (undated) DEVICE — [ARTHROSCOPY PUMP,  DO NOT USE IF PACKAGE IS DAMAGED,  KEEP DRY,  KEEP AWAY FROM SUNLIGHT,  PROTECT FROM HEAT AND RADIOACTIVE SOURCES.]: Brand: FLOSTEADY

## (undated) DEVICE — 35 ML SYRINGE LUER-LOCK TIP: Brand: MONOJECT

## (undated) DEVICE — ADHESIVE SKIN CLSR 0.7ML TOP DERMBND ADV

## (undated) DEVICE — TOWEL,OR,DSP,ST,BLUE,DLX,4/PK,20PK/CS: Brand: MEDLINE

## (undated) DEVICE — BANDAGE COBAN 4 IN COMPR W4INXL5YD FOAM COHESIVE QUIK STK SELF ADH SFT

## (undated) DEVICE — BASIC SINGLE BASIN BTC-LF: Brand: MEDLINE INDUSTRIES, INC.

## (undated) DEVICE — PAD,ABDOMINAL,5"X9",ST,LF,25/BX: Brand: MEDLINE INDUSTRIES, INC.

## (undated) DEVICE — ROYAL SILK SURGICAL GOWN, XXL: Brand: CONVERTORS

## (undated) DEVICE — GLOVE ORANGE PI 8 1/2   MSG9085

## (undated) DEVICE — APPLICATOR MEDICATED 26 CC SOLUTION HI LT ORNG CHLORAPREP

## (undated) DEVICE — 450 ML BOTTLE OF 0.05% CHLORHEXIDINE GLUCONATE IN 99.95% STERILE WATER FOR IRRIGATION, USP AND APPLICATOR.: Brand: IRRISEPT ANTIMICROBIAL WOUND LAVAGE

## (undated) DEVICE — GLOVE SURG SZ 9 THK91MIL LTX FREE SYN POLYISOPRENE ANTI

## (undated) DEVICE — PADDING CAST W6INXL4YD COT LO LINTING WYTEX